# Patient Record
Sex: FEMALE | Race: BLACK OR AFRICAN AMERICAN | Employment: UNEMPLOYED | URBAN - METROPOLITAN AREA
[De-identification: names, ages, dates, MRNs, and addresses within clinical notes are randomized per-mention and may not be internally consistent; named-entity substitution may affect disease eponyms.]

---

## 2018-10-25 ENCOUNTER — OFFICE VISIT (OUTPATIENT)
Dept: ORTHOPEDIC SURGERY | Age: 23
End: 2018-10-25

## 2018-10-25 VITALS
BODY MASS INDEX: 33.8 KG/M2 | SYSTOLIC BLOOD PRESSURE: 108 MMHG | DIASTOLIC BLOOD PRESSURE: 70 MMHG | RESPIRATION RATE: 15 BRPM | TEMPERATURE: 98.2 F | HEIGHT: 64 IN | WEIGHT: 198 LBS | HEART RATE: 79 BPM

## 2018-10-25 DIAGNOSIS — M99.08 RIB CAGE REGION SOMATIC DYSFUNCTION: ICD-10-CM

## 2018-10-25 DIAGNOSIS — S29.019A THORACIC MYOFASCIAL STRAIN, INITIAL ENCOUNTER: Primary | ICD-10-CM

## 2018-10-25 DIAGNOSIS — M99.03 LUMBAR REGION SOMATIC DYSFUNCTION: ICD-10-CM

## 2018-10-25 DIAGNOSIS — M99.02 THORACIC REGION SOMATIC DYSFUNCTION: ICD-10-CM

## 2018-10-25 RX ORDER — LORATADINE 10 MG/1
10 TABLET ORAL
COMMUNITY

## 2018-10-25 RX ORDER — MELOXICAM 15 MG/1
15 TABLET ORAL DAILY
Qty: 90 TAB | Refills: 0 | Status: SHIPPED | OUTPATIENT
Start: 2018-10-25

## 2018-10-25 NOTE — PROGRESS NOTES
HISTORY OF PRESENT Td Chowdhury is a 26y.o. year old female comes in today as new patient for: back pain    Patients symptoms have been present for 1 years. Pain Scale: 10 - Worst pain ever/10 mid to low back, It has worsened with childbirth and epidural.  Patient has tried:  Stretches and ibuprofen/percocet. It is described as pain and tightness in back. Is not breast feeding. Social History     Socioeconomic History    Marital status: UNKNOWN     Spouse name: Not on file    Number of children: Not on file    Years of education: Not on file    Highest education level: Not on file   Social Needs    Financial resource strain: Not on file    Food insecurity - worry: Not on file    Food insecurity - inability: Not on file   [employer] needs - medical: Not on file   [employer] needs - non-medical: Not on file   Occupational History    Not on file   Tobacco Use    Smoking status: Never Smoker    Smokeless tobacco: Never Used   Substance and Sexual Activity    Alcohol use: Yes     Comment: occasionally    Drug use: No    Sexual activity: Not on file   Other Topics Concern    Not on file   Social History Narrative    Not on file     Current Outpatient Medications   Medication Sig Dispense Refill    loratadine (CLARITIN) 10 mg tablet Take 10 mg by mouth. History reviewed. No pertinent past medical history. History reviewed. No pertinent family history. ROS:  No numb, some locking back  All other systems reviewed and negative aside from that written in the HPI. Objective:  /70   Pulse 79   Temp 98.2 °F (36.8 °C)   Resp 15   Ht 5' 4\" (1.626 m)   Wt 198 lb (89.8 kg)   BMI 33.99 kg/m²   GEN:  Appears stated age in NAD. HEAD:  Normocephalic, Atraumatic. NEURO:  Sensation intact light touch upper and lower extremities. Biceps & Triceps reflexes +2/4 bilaterally. Patellar and Achilles +2/4  M/S:  Examined seating and supine.   Spurling negative bilateral . Cervical rotation Normal bilateral  TTA at T8, 9 on right, worse with flexion, L3 lrft worse flexion. Rib(s) 8, 9 TTP on right. Strength +5/5 Bilateral upper and lower extremities. EXT  no clubbing/cyanosis. no edema. SKIN: Warm & dry w/o rash. HEENT: Conjunctiva/lids WNL. External canals/nares WNL. Tongue midline. PERRL, EOMI. Hearing intact. NECK: Trachea midline. Supple, Full ROM. No thyromegaly. CARDIAC: No edema. LUNGS: Normal effort. ABD: Soft, no masses. No HSM. PSYCH: A+O x3. Appropriate judgment and insight. Assessment/Plan:   Encounter Diagnoses   Name Primary?  Thoracic myofascial strain, initial encounter Yes    Lumbar region somatic dysfunction     Thoracic region somatic dysfunction     Rib cage region somatic dysfunction      Orders Placed This Encounter    OH OSTEOPATHIC MANIP,3-4 BODY REGN    meloxicam (MOBIC) 15 mg tablet     Sig: Take 1 Tab by mouth daily. Dispense:  90 Tab     Refill:  0     Patient (or guardian if minor) verbalizes understanding of evaluation and plan. Verbal consent obtained. Thoracic, Rib and Lumbar SD treated with ME and HVLA. Correction of previous malalignments verified after Tx. Pt tolerated well. Notes improvement of Sx and pain is now rated 1/10. HEP/stretches daily. Discussed stretching/strengthening/posture.

## 2018-11-13 NOTE — ADDENDUM NOTE
Addended by: [facility] DR DANIEL MANNING on: 11/13/2018 08:52 AM     Modules accepted: Level of Service